# Patient Record
Sex: MALE | Race: BLACK OR AFRICAN AMERICAN | Employment: STUDENT | URBAN - METROPOLITAN AREA
[De-identification: names, ages, dates, MRNs, and addresses within clinical notes are randomized per-mention and may not be internally consistent; named-entity substitution may affect disease eponyms.]

---
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
## 2022-02-24 ENCOUNTER — OFFICE VISIT (OUTPATIENT)
Dept: INTERNAL MEDICINE CLINIC | Age: 22
End: 2022-02-24
Payer: COMMERCIAL

## 2022-02-24 VITALS
BODY MASS INDEX: 17.39 KG/M2 | HEART RATE: 113 BPM | OXYGEN SATURATION: 99 % | SYSTOLIC BLOOD PRESSURE: 132 MMHG | DIASTOLIC BLOOD PRESSURE: 80 MMHG | HEIGHT: 70 IN | TEMPERATURE: 98.5 F | WEIGHT: 121.5 LBS

## 2022-02-24 DIAGNOSIS — Z00.00 ROUTINE ADULT HEALTH MAINTENANCE: Primary | ICD-10-CM

## 2022-02-24 DIAGNOSIS — M54.2 CERVICALGIA: ICD-10-CM

## 2022-02-24 DIAGNOSIS — M54.50 ACUTE MIDLINE LOW BACK PAIN WITHOUT SCIATICA: ICD-10-CM

## 2022-02-24 DIAGNOSIS — R00.0 TACHYCARDIA: ICD-10-CM

## 2022-02-24 DIAGNOSIS — Z11.59 NEED FOR HEPATITIS C SCREENING TEST: ICD-10-CM

## 2022-02-24 PROCEDURE — 99204 OFFICE O/P NEW MOD 45 MIN: CPT | Performed by: INTERNAL MEDICINE

## 2022-02-24 PROCEDURE — 93000 ELECTROCARDIOGRAM COMPLETE: CPT | Performed by: INTERNAL MEDICINE

## 2022-02-24 NOTE — PROGRESS NOTES
Chief Complaint   Patient presents with    Establish Care     Visit Vitals  /80 (BP 1 Location: Left upper arm, BP Patient Position: Sitting, BP Cuff Size: Adult)   Pulse (!) 113   Temp 98.5 °F (36.9 °C) (Oral)   Ht 5' 10.28\" (1.785 m)   Wt 121 lb 8 oz (55.1 kg)   SpO2 99%   BMI 17.30 kg/m²         1. Have you been to the ER, urgent care clinic since your last visit? Hospitalized since your last visit? No    2. Have you seen or consulted any other health care providers outside of the [address] [place] [address] since your last visit? Include any pap smears or colon screening.  No

## 2022-02-24 NOTE — PATIENT INSTRUCTIONS
Please start monitoring your blood pressure at home using a home blood pressure monitor. Please record your readings and bring these with you to your next visit. Back Pain: Care Instructions  Your Care Instructions     Back pain has many possible causes. It is often related to problems with muscles and ligaments of the back. It may also be related to problems with the nerves, discs, or bones of the back. Moving, lifting, standing, sitting, or sleeping in an awkward way can strain the back. Sometimes you don't notice the injury until later. Arthritis is another common cause of back pain. Although it may hurt a lot, back pain usually improves on its own within several weeks. Most people recover in 12 weeks or less. Using good home treatment and being careful not to stress your back can help you feel better sooner. Follow-up care is a key part of your treatment and safety. Be sure to make and go to all appointments, and call your doctor if you are having problems. It's also a good idea to know your test results and keep a list of the medicines you take. How can you care for yourself at home? · Sit or lie in positions that are most comfortable and reduce your pain. Try one of these positions when you lie down:  ? Lie on your back with your knees bent and supported by large pillows. ? Lie on the floor with your legs on the seat of a sofa or chair. ? Lie on your side with your knees and hips bent and a pillow between your legs. ? Lie on your stomach if it does not make pain worse. · Do not sit up in bed, and avoid soft couches and twisted positions. Bed rest can help relieve pain at first, but it delays healing. Avoid bed rest after the first day of back pain. · Change positions every 30 minutes. If you must sit for long periods of time, take breaks from sitting. Get up and walk around, or lie in a comfortable position.   · Try using a heating pad on a low or medium setting for 15 to 20 minutes every 2 or 3 hours. Try a warm shower in place of one session with the heating pad. · You can also try an ice pack for 10 to 15 minutes every 2 to 3 hours. Put a thin cloth between the ice pack and your skin. · Take pain medicines exactly as directed. ? If the doctor gave you a prescription medicine for pain, take it as prescribed. ? If you are not taking a prescription pain medicine, ask your doctor if you can take an over-the-counter medicine. · Take short walks several times a day. You can start with 5 to 10 minutes, 3 or 4 times a day, and work up to longer walks. Walk on level surfaces and avoid hills and stairs until your back is better. · Return to work and other activities as soon as you can. Continued rest without activity is usually not good for your back. · To prevent future back pain, do exercises to stretch and strengthen your back and stomach. Learn how to use good posture, safe lifting techniques, and proper body mechanics. When should you call for help? Call your doctor now or seek immediate medical care if:    · You have new or worsening numbness in your legs.     · You have new or worsening weakness in your legs. (This could make it hard to stand up.)     · You lose control of your bladder or bowels. Watch closely for changes in your health, and be sure to contact your doctor if:    · You have a fever, lose weight, or don't feel well.     · You do not get better as expected. Where can you learn more? Go to http://www.high.com/  Enter I594 in the search box to learn more about \"Back Pain: Care Instructions. \"  Current as of: July 1, 2021               Content Version: 13.0  © 5000-5804 Healthwise, Incorporated. Care instructions adapted under license by [employer] (which disclaims liability or warranty for this information).  If you have questions about a medical condition or this instruction, always ask your healthcare professional. [employer] disclaims any warranty or liability for your use of this information. Nonsteroidal Anti-Inflammatory Drugs (NSAIDs): Care Instructions  Your Care Instructions     Nonsteroidal anti-inflammatory drugs (NSAIDs) relieve pain and fever. You also can use them to reduce swelling and inflammation. Over-the-counter NSAIDs include:  · Ibuprofen (Advil, Motrin). · Naproxen (Aleve). Aspirin (Carol, Bufferin) is also an NSAID. But it doesn't work the same way as these other NSAIDs. Prescription NSAIDs include:  · Diclofenac (Voltaren). · Indomethacin (Indocin). · Piroxicam (Feldene). Take NSAIDS exactly as prescribed. Call your doctor if you think you are having a problem with your medicine. If you are taking over-the-counter medicine, read and follow all instructions on the label. Follow-up care is a key part of your treatment and safety. Be sure to make and go to all appointments, and call your doctor if you are having problems. It's also a good idea to know your test results and keep a list of the medicines you take. What should you know about NSAIDs? · Do not use an over-the-counter NSAID for longer than 10 days. Talk to your doctor first.  · The most common side effects from NSAIDs are stomachaches, heartburn, and nausea. NSAIDs may irritate the stomach lining. If the medicine upsets your stomach, you can try taking it with food. But if that doesn't help, talk with your doctor to make sure it's not a more serious problem, such as a stomach ulcer or bleeding in the stomach or intestines. · Using NSAIDs may:  ? Lead to high blood pressure. ? Make symptoms of heart failure worse. ? Raise the risk of heart attack, stroke, kidney damage, and skin reactions. · Your risks are greater if you take NSAIDs at higher doses or for longer than the label says. People who are older than 72 or who have heart, stomach, or intestinal disease have a higher risk for problems. Aspirin  Aspirin is not like other NSAIDs. It can help people who are at high risk for heart attack or stroke. But taking aspirin isn't right for everyone, because it can cause serious bleeding. Talk to your doctor before you start taking aspirin every day. You and your doctor can decide if aspirin is a good choice for you based on your risk of a heart attack or stroke and your risk of serious bleeding. Unless you have a high risk of a heart attack or stroke, the benefits of aspirin probably won't outweigh the risk of bleeding. · If you use other NSAIDs a lot, aspirin may not work as well to prevent heart attack and stroke. · If you take aspirin every day for your heart, talk with your doctor before you take other NSAIDs. · Do not give aspirin to anyone younger than 20. It has been linked to Reye syndrome, a serious illness. When should you call for help? Call 911 anytime you think you may need emergency care. For example, call if:    · You passed out (lost consciousness).     · You vomit blood or what looks like coffee grounds.     · You pass maroon or very bloody stools. Call your doctor now or seek immediate medical care if:    · Your stools are black and tarlike or have streaks of blood. Watch closely for changes in your health, and be sure to contact your doctor if you have any problems. Where can you learn more? Go to http://www.gray.com/  Enter A328 in the search box to learn more about \"Nonsteroidal Anti-Inflammatory Drugs (NSAIDs): Care Instructions. \"  Current as of: April 8, 2021               Content Version: 13.0  © 2006-2021 Healthwise, Incorporated. Care instructions adapted under license by [employer] (which disclaims liability or warranty for this information). If you have questions about a medical condition or this instruction, always ask your healthcare professional. [place] [address] any warranty or liability for your use of this information.            Anxiety Disorder: Care Instructions  Your Care Instructions     Anxiety is a normal reaction to stress. Difficult situations can cause you to have symptoms such as sweaty palms and a nervous feeling. In an anxiety disorder, the symptoms are far more severe. Constant worry, muscle tension, trouble sleeping, nausea and diarrhea, and other symptoms can make normal daily activities difficult or impossible. These symptoms may occur for no reason, and they can affect your work, school, or social life. Medicines, counseling, and self-care can all help. Follow-up care is a key part of your treatment and safety. Be sure to make and go to all appointments, and call your doctor if you are having problems. It's also a good idea to know your test results and keep a list of the medicines you take. How can you care for yourself at home? · Take medicines exactly as directed. Call your doctor if you think you are having a problem with your medicine. · Go to your counseling sessions and follow-up appointments. · Recognize and accept your anxiety. Then, when you are in a situation that makes you anxious, say to yourself, \"This is not an emergency. I feel uncomfortable, but I am not in danger. I can keep going even if I feel anxious. \"  · Be kind to your body:  ? Relieve tension with exercise or a massage. ? Get enough rest.  ? Avoid alcohol, caffeine, nicotine, and illegal drugs. They can increase your anxiety level and cause sleep problems. ? Learn and do relaxation techniques. See below for more about these techniques. · Engage your mind. Get out and do something you enjoy. Go to a funny movie, or take a walk or hike. Plan your day. Having too much or too little to do can make you anxious. · Keep a record of your symptoms. Discuss your fears with a good friend or family member, or join a support group for people with similar problems. Talking to others sometimes relieves stress. · Get involved in social groups, or volunteer to help others. Being alone sometimes makes things seem worse than they are. · Get at least 30 minutes of exercise on most days of the week to relieve stress. Walking is a good choice. You also may want to do other activities, such as running, swimming, cycling, or playing tennis or team sports. Relaxation techniques  Do relaxation exercises 10 to 20 minutes a day. You can play soothing, relaxing music while you do them, if you wish. · Tell others in your house that you are going to do your relaxation exercises. Ask them not to disturb you. · Find a comfortable place, away from all distractions and noise. · Lie down on your back, or sit with your back straight. · Focus on your breathing. Make it slow and steady. · Breathe in through your nose. Breathe out through either your nose or mouth. · Breathe deeply, filling up the area between your navel and your rib cage. Breathe so that your belly goes up and down. · Do not hold your breath. · Breathe like this for 5 to 10 minutes. Notice the feeling of calmness throughout your whole body. As you continue to breathe slowly and deeply, relax by doing the following for another 5 to 10 minutes:  · Tighten and relax each muscle group in your body. You can begin at your toes and work your way up to your head. · Imagine your muscle groups relaxing and becoming heavy. · Empty your mind of all thoughts. · Let yourself relax more and more deeply. · Become aware of the state of calmness that surrounds you. · When your relaxation time is over, you can bring yourself back to alertness by moving your fingers and toes and then your hands and feet and then stretching and moving your entire body. Sometimes people fall asleep during relaxation, but they usually wake up shortly afterward. · Always give yourself time to return to full alertness before you drive a car or do anything that might cause an accident if you are not fully alert.  Never play a relaxation tape while you drive a car.  When should you call for help? Call 911 anytime you think you may need emergency care. For example, call if:    · You feel you cannot stop from hurting yourself or someone else. Keep the numbers for these national suicide hotlines: 2-906-098-TALK (8-543.533.7361) and 1-885-PBOYNNE (2-418.606.8328). If you or someone you know talks about suicide or feeling hopeless, get help right away. Watch closely for changes in your health, and be sure to contact your doctor if:    · You have anxiety or fear that affects your life.     · You have symptoms of anxiety that are new or different from those you had before. Where can you learn more? Go to http://www.high.com/  Enter P754 in the search box to learn more about \"Anxiety Disorder: Care Instructions. \"  Current as of: June 16, 2021               Content Version: 13.0  © 2006-2021 [employer]. Care instructions adapted under license by [employer] (which disclaims liability or warranty for this information). If you have questions about a medical condition or this instruction, always ask your healthcare professional. [address] any warranty or liability for your use of this information. Depression and Chronic Disease: Care Instructions  Your Care Instructions     A chronic disease is one that you have for a long time. Some chronic diseases can be controlled, but they usually cannot be cured. Depression is common in people with chronic diseases, but it often goes unnoticed. Many people have concerns about seeking treatment for a mental health problem. You may think it's a sign of weakness, or you don't want people to know about it. It's important to overcome these reasons for not seeking treatment. Treating depression or anxiety is good for your health. Follow-up care is a key part of your treatment and safety.  Be sure to make and go to all appointments, and call your doctor if you are having problems. It's also a good idea to know your test results and keep a list of the medicines you take. How can you care for yourself at home? Watch for symptoms of depression  The symptoms of depression are often subtle at first. You may think they are caused by your disease rather than depression. Or you may think it is normal to be depressed when you have a chronic disease. If you are depressed you may:  · Feel sad or hopeless. · Feel guilty or worthless. · Not enjoy the things you used to enjoy. · Feel hopeless, as though life is not worth living. · Have trouble thinking or remembering. · Have low energy, and you may not eat or sleep well. · Pull away from others. · Think often about death or killing yourself. (Keep the numbers for these national suicide hotlines: 6-486-479-TALK [1-235.279.2325] and 0-717-ZYDFSJU [1-950.793.4316]. )  Get treatment  By treating your depression, you can feel more hopeful and have more energy. If you feel better, you may take better care of yourself, so your health may improve. · Talk to your doctor if you have any changes in mood during treatment for your disease. · Ask your doctor for help. Counseling, antidepressant medicine, or a combination of the two can help most people with depression. Often a combination works best. Counseling can also help you cope with having a chronic disease. When should you call for help? Call 911 anytime you think you may need emergency care. For example, call if:    · You feel like hurting yourself or someone else.     · Someone you know has depression and is about to attempt or is attempting suicide. Call your doctor now or seek immediate medical care if:    · You hear voices.     · Someone you know has depression and:  ? Starts to give away his or her possessions. ? Uses illegal drugs or drinks alcohol heavily.   ? Talks or writes about death, including writing suicide notes or talking about guns, knives, or pills.  ? Starts to spend a lot of time alone. ? Acts very aggressively or suddenly appears calm. Watch closely for changes in your health, and be sure to contact your doctor if:    · You do not get better as expected. Where can you learn more? Go to http://www.gray.com/  Enter A548 in the search box to learn more about \"Depression and Chronic Disease: Care Instructions. \"  Current as of: June 16, 2021               Content Version: 13.0  © 2006-2021 [employer]. Care instructions adapted under license by [employer] (which disclaims liability or warranty for this information). If you have questions about a medical condition or this instruction, always ask your healthcare professional. [address] any warranty or liability for your use of this information.

## 2022-02-24 NOTE — PROGRESS NOTES
Subjective:     Chief Complaint   Patient presents with   UlJerman Schofield is a 22 y.o. M. This 22Year old male patient was seen most recently in the primary care setting in December 2020. He had been recently referred to the practice by his father. He was noted to be taking online classes in [employer] science, having recently changed to [employer] online because of COVID-19. He had declined flu vaccination. He was not sexually active at that time and was without significant medical history. Hypertension was noted but was attributed to nervousness/white coat syndrome. Routine labs were ordered. Today, this patient comes in for establishment and chief complaint of numbness and tingling in his hands as well as a concern about back pain. He does not have any prior medical history, and does not take any medications. He says that starting about 2 weeks ago, he had woken from bed with a feeling of numbness and tingling in his bilateral hands. This was accompanied by pain in his lower back. He also says that he felt that his body was generally weak at the time. He was able to walk, but felt unsteady. Over the past 2 weeks, the sensations have generally improved to the point where he no longer feels them; the tingling has generally gone away, and his lower back pain has generally improved. He denies having had any radiation of the pain down his legs, and furthermore denies any groin anesthesia during this time, any incontinence, fevers, chills, or prior antecedent trauma. He thinks that the back pain is now manageable at a level of 4 out of 10. He is now able to stand for prolonged periods of time, which he was not able to do previously. He denies any personal history of cancer, steroid use, or intravenous drug use. He denies any antecedent febrile syndromes including any upper respiratory symptoms or gastrointestinal symptoms.     He wonders if some of his symptoms might be stress related, because his grandfather, with whom he had been close, had recently passed away unexpectedly. He admits to some depression and anxiety but denies any suicidal or homicidal ideation. He overall feels that he is eating and drinking the same as usual, and denies any alcohol use, tobacco use, or recreational drug use. He denies any use of herbal medications. He is not currently sexually active. He reports being up-to-date with regard to his vaccinations, but does not have his vaccination record with him. He did get his Covid vaccine earlier this year. His review of systems is otherwise unremarkable. Past Medical History:  History reviewed. No pertinent past medical history. Past Surgical Histor:  History reviewed. No pertinent surgical history. Allergies:  No Known Allergies    Medications:  Current Outpatient Medications   Medication Sig Dispense Refill    multivitamin (ONE A DAY) tablet Take 1 Tab by mouth daily.          Social History:  Social History     Socioeconomic History    Marital status: SINGLE   Tobacco Use    Smoking status: Never Smoker    Smokeless tobacco: Never Used   Substance and Sexual Activity    Alcohol use: Never    Drug use: Never    Sexual activity: Never       Family History:  Family History   Problem Relation Age of Onset    No Known Problems Mother     No Known Problems Father     No Known Problems Sister     No Known Problems Brother     No Known Problems Maternal Aunt     No Known Problems Maternal Uncle     No Known Problems Paternal Aunt     No Known Problems Paternal Uncle     No Known Problems Maternal Grandmother     No Known Problems Maternal Grandfather     Diabetes Paternal Grandmother     No Known Problems Paternal Grandfather        Immunizations:  Immunization History   Administered Date(s) Administered    COVID-19, Pfizer Purple top, DILUTE for use, 12+ yrs, 30mcg/0.3mL dose 05/24/2021, 06/14/2021        Healthcare Maintenance:  Health Maintenance   Topic Date Due    Hepatitis C Screening  Never done    DTaP/Tdap/Td series (1 - Tdap) Never done    HPV Age 9Y-34Y (3 - Male 2-dose series) Never done    Flu Vaccine (1) Never done    COVID-19 Vaccine (3 - Booster for [employer] series) 11/14/2021    Depression Screen  12/29/2021    Hepatitis A Peds Age 1-18  Aged Out    Pneumococcal 0-64 years  Aged Out        Review of Systems:  ROS:  Review of Systems   Constitutional: Negative. HENT: Negative. Eyes: Negative. Respiratory: Negative. Cardiovascular: Positive for palpitations. Gastrointestinal: Negative. Genitourinary: Negative. Musculoskeletal: Positive for back pain and neck pain. Negative for falls, joint pain and myalgias. Skin: Negative. Neurological: Negative. Endo/Heme/Allergies: Negative. Psychiatric/Behavioral: Negative. ROS otherwise negative      Objective:     Vital Signs:  Visit Vitals  /80 (BP 1 Location: Left upper arm, BP Patient Position: Sitting, BP Cuff Size: Adult)   Pulse (!) 113   Temp 98.5 °F (36.9 °C) (Oral)   Ht 5' 10.28\" (1.785 m)   Wt 121 lb 8 oz (55.1 kg)   SpO2 99%   BMI 17.30 kg/m²       BMI:  Body mass index is 17.3 kg/m². Physical Examination:  Physical Exam  Vitals reviewed. Constitutional:       Appearance: Normal appearance. HENT:      Head: Normocephalic and atraumatic. Nose: Nose normal.      Mouth/Throat:      Mouth: Mucous membranes are moist.   Eyes:      Extraocular Movements: Extraocular movements intact. Conjunctiva/sclera: Conjunctivae normal.      Pupils: Pupils are equal, round, and reactive to light. Cardiovascular:      Rate and Rhythm: Tachycardia present. Rhythm irregular. Pulses: Normal pulses. Heart sounds: Normal heart sounds. No murmur heard. No friction rub. No gallop.        Comments: Regular with frequent irregularity consistent with respirophasic variation, 12lead EKG pending  Pulmonary:      Effort: Pulmonary effort is normal. No respiratory distress. Breath sounds: Normal breath sounds. No wheezing, rhonchi or rales. Abdominal:      General: Bowel sounds are normal. There is no distension. Palpations: Abdomen is soft. There is no mass. Tenderness: There is no abdominal tenderness. There is no guarding or rebound. Musculoskeletal:         General: No tenderness, deformity or signs of injury. Normal range of motion. Cervical back: Normal range of motion and neck supple. Right lower leg: No edema. Left lower leg: No edema. Skin:     General: Skin is warm and dry. Findings: No bruising, lesion or rash. Neurological:      General: No focal deficit present. Mental Status: He is alert and oriented to person, place, and time. Mental status is at baseline. Cranial Nerves: No cranial nerve deficit. Sensory: No sensory deficit. Motor: No weakness. Coordination: Coordination normal.      Gait: Gait normal.      Deep Tendon Reflexes: Reflexes normal.      Comments: Straight leg raise test negative bilaterally. Psychiatric:         Mood and Affect: Mood normal.         Behavior: Behavior normal.          Physical exam otherwise negative    Diagnostic Testing:    Laboratory Studies:  No visits with results within 1 Year(s) from this visit. Latest known visit with results is:   Office Visit on 12/29/2020   Component Date Value Ref Range Status    LIPID PROFILE 12/29/2020        Final    Cholesterol, total 12/29/2020 195  <200 MG/DL Final    Triglyceride 12/29/2020 134  <150 MG/DL Final    Comment: Based on NCEP-ATP III:  Triglycerides <150 mg/dL  is considered normal, 150-199  mg/dL  borderline high,  200-499 mg/dL high and  greater than or equal to 500  mg/dL very high.       HDL Cholesterol 12/29/2020 56  MG/DL Final    LDL, calculated 12/29/2020 112.2* 0 - 100 MG/DL Final    Comment: Based on the NCEP-ATP: LDL-C concentrations are considered  optimal <100 mg/dL,  near optimal/above Normal 100-129 mg/dL Borderline High: 130-159, High: 160-189  mg/dL Very High: Greater than or equal to 190 mg/dL      VLDL, calculated 12/29/2020 26.8  MG/DL Final    CHOL/HDL Ratio 12/29/2020 3.5  0.0 - 5.0   Final    Sodium 12/29/2020 137  136 - 145 mmol/L Final    Potassium 12/29/2020 4.2  3.5 - 5.1 mmol/L Final    Chloride 12/29/2020 105  97 - 108 mmol/L Final    CO2 12/29/2020 30  21 - 32 mmol/L Final    Anion gap 12/29/2020 2* 5 - 15 mmol/L Final    Glucose 12/29/2020 95  65 - 100 mg/dL Final    BUN 12/29/2020 17  6 - 20 MG/DL Final    Creatinine 12/29/2020 0.99  0.70 - 1.30 MG/DL Final    BUN/Creatinine ratio 12/29/2020 17  12 - 20   Final    GFR est AA 12/29/2020 >60  >60 ml/min/1.73m2 Final    GFR est non-AA 12/29/2020 >60  >60 ml/min/1.73m2 Final    Comment: Estimated GFR is calculated using the IDMS-traceable Modification of Diet in  Renal Disease (MDRD) Study equation, reported for both  Americans  (GFRAA) and non- Americans (GFRNA), and normalized to 1.73m2 body  surface area. The physician must decide which value applies to the patient.  Calcium 12/29/2020 9.3  8.5 - 10.1 MG/DL Final    Bilirubin, total 12/29/2020 0.8  0.2 - 1.0 MG/DL Final    ALT (SGPT) 12/29/2020 33  12 - 78 U/L Final    AST (SGOT) 12/29/2020 17  15 - 37 U/L Final    Alk. phosphatase 12/29/2020 87  45 - 117 U/L Final    Protein, total 12/29/2020 7.8  6.4 - 8.2 g/dL Final    Albumin 12/29/2020 4.5  3.5 - 5.0 g/dL Final    Globulin 12/29/2020 3.3  2.0 - 4.0 g/dL Final    A-G Ratio 12/29/2020 1.4  1.1 - 2.2   Final    WBC 12/29/2020 4.8  4.1 - 11.1 K/uL Final    RBC 12/29/2020 5.10  4. 10 - 5.70 M/uL Final    HGB 12/29/2020 13.6  12.1 - 17.0 g/dL Final    HCT 12/29/2020 42.7  36.6 - 50.3 % Final    MCV 12/29/2020 83.7  80.0 - 99.0 FL Final    MCH 12/29/2020 26.7  26.0 - 34.0 PG Final    MCHC 12/29/2020 31.9  30.0 - 36.5 g/dL Final    RDW 12/29/2020 12.4  11.5 - 14.5 % Final  PLATELET 09/56/3473 159  150 - 400 K/uL Final    MPV 12/29/2020 10.9  8.9 - 12.9 FL Final    NRBC 12/29/2020 0.0  0  WBC Final    ABSOLUTE NRBC 12/29/2020 0.00  0.00 - 0.01 K/uL Final         Radiographic Studies:  XR Results (most recent):  No results found for this or any previous visit. LYUBOV Results (most recent):  No results found for this or any previous visit. CT Results (most recent):  No results found for this or any previous visit. DEXA Results (most recent):  No results found for this or any previous visit. MRI Results (most recent):  No results found for this or any previous visit. Assessment/Plan:       ICD-10-CM ICD-9-CM    1. Routine adult health maintenance  Z00.00 V70.0 HEPATITIS C AB      HEMOGLOBIN A1C WITH EAG      CBC WITH AUTOMATED DIFF      METABOLIC PANEL, COMPREHENSIVE      LIPID PANEL      HIV 1/2 AG/AB, 4TH GENERATION,W RFLX CONFIRM   2. Need for hepatitis C screening test  Z11.59 V73.89 HEPATITIS C AB   3. Tachycardia  R00.0 785.0 AMB POC EKG ROUTINE W/ 12 LEADS, INTER & REP   4. Acute midline low back pain without sciatica  M54.50 724.2 SED RATE (ESR)   5. Cervicalgia  M54.2 723.1           This young male without significant past medical history presents with back pain and neck pain. On further questioning, the patient relates that he thinks he may have been sleeping on his bed funny, because it has a headboard and is set low to the ground. He thinks that he sometimes wakes up with his head in an awkward position. He does not have any red flag symptoms with regard to his back pain, and it is possible that the patient had a transient cervical radiculopathy or myelopathy from local inflammation, possibly from malpositioning of his head while sleeping. He does not have any focal neurological findings today on a more thorough evaluation. I am doubtful of a more systemic process such as demyelinating polyneuropathy or Guillain-Barré syndrome.   Given his nonfocal examination today, acute cervicalgia and lumbago from a simple musculoskeletal source seem the most likely. I do not see an indication for imaging currently given the patient's interval provement, but counseled the patient that should his symptoms fail to completely resolve with conservative management or should they return, that we would have a low threshold for obtaining plain films and an MRI at that point. He endorses agreement and understanding. With regard to the patient's relative hypertension and tachycardia, white coat syndrome seems likely. The patient will be counseled to monitor his blood pressure readings at home, and to consider bringing in his blood pressure cuff for calibration here in clinic. A 12-lead EKG is pending. I suspect that this will show normal sinus rhythm with respiratory phasic variation. Regarding the patient's anxiety, I suspect that this is related to temporary stressors; there are no other red flag symptoms at this time. Could consider pharmacotherapy but deferring this for now. Counseled patient that referral to psychology might be reasonable for brief psychotherapy or cognitive behavioral therapy. Holding off on this for now per patient, reconsider in the future. Return precautions provided. Patient endorses agreement/ understanding. Over 50% of today's 45-minute visit was spent counseling the patient regarding potential differential diagnoses, treatment options, and plan of care. Katheen Halsted, MD    Please note that this dictation was completed with [employer], the computer voice recognition software. Quite often unanticipated grammatical, syntax, homophones, and other interpretive errors are inadvertently transcribed by the computer software. Please disregard these errors. Please excuse any errors that have escaped final proofreading.

## 2023-02-22 NOTE — PROGRESS NOTES
ICD-10-CM ICD-9-CM    1. Routine adult health maintenance  Z00.00 V70.0 HEMOGLOBIN A1C WITH EAG      CBC WITH AUTOMATED DIFF      METABOLIC PANEL, COMPREHENSIVE      LIPID PANEL      MICROALBUMIN, UR, RAND W/ MICROALB/CREAT RATIO      2. Atypical chest pain  R07.89 786.59 AMB POC EKG ROUTINE W/ 12 LEADS, INTER & REP      3. Numbness and tingling in both hands  R20.0 782.0 REFERRAL TO NEUROLOGY    R20.2        4. Screening for viral disease  Z11.59 V73.99 HEPATITIS C AB      5. Cardiac murmur  R01.1 785.2 ECHO ADULT COMPLETE      REFERRAL TO CARDIOLOGY      6. Palpitations  R00.2 785. 1 ECG EVENT RECORD MONITOR SETUP AND ANALYSIS               Subjective:     Chief Complaint   Patient presents with    Physical       Mary Luo is a 25 y.o. M.  he  has a past medical history of Hypercholesterolemia. his last appointment in this clinic was 2/24/2022 . I reviewed and updated the medical record. At this patient's most recent appointment with me in February 2022, he had been feeling generally fine except for complaint of numbness and tingling in his hands and a concern regarding back pain. He had reported numbness and tingling in the bilateral hands. He was noted to have undergone some social stresses as some members of his family had recently passed away. Physical examination had demonstrated a low BMI of 17.3 kg/m², as well as tachycardia. Straight leg raise testing was negative. He was felt likely to have a simple musculoskeletal source for his symptoms. Routine laboratory studies were ordered. Today, the patient comes in for a yearly follow-up. He has today a complaint of chest pain as well as numbness and tingling of the hands as before. Chest pain: He complains today of a somewhat vague history of chest pain. The patient is noted to be a vague historian, and it is difficult to ascertain many details of his complaints.   He expresses a concern regarding recurrent chest discomfort, which typically occurs in the mornings, when he is waking up. He says that he thinks that this is because of the way he is laying in bed, although he is not able to explain why. He describes a vague chest discomfort, that he locates to the sternal area, typically in the mornings but at times at other times during the day. He thinks that he has episodes about 2-3 times per week, and thinks that this had really started about 6 months ago, gone away, and then come back, although once again he is somewhat vague regarding this. He denies any associated shortness of breath, dyspnea on exertion, or changes in exercise tolerance with this. He furthermore denies having had any other associated symptoms including fevers, chills, periods of immobilization, association with travel, history of blood clots, or any other constitutional or systemic complaints. He denies any orthopnea or paroxysmal nocturnal dyspnea. He denies having had any shortness of breath with the episodes, although he does have palpitations from time to time. He denies any family history of early cardiac disease or other significant cardiovascular or cerebrovascular disease, and he denies having had any other cardiac risk factors including smoking. He denies any heartburn. He denies any wheezing. Numbness/tingling: As before, he continues to complain of numbness and tingling in the bilateral hands. Although he had neck pain previously, this is not a current concern. He describes once again a vague history of intermittent numbness and cooling, typically occurring about 2-3 times per week, although not associated with the chest discomfort. He locates this to the third and fourth fingers of both hands, sometimes feeling this also in his feet from time to time. He denies any weakness. He denies any difficulty with balance. He denies any recent other focal neurological concerns.   His appetite and activity level have been normal.    His review of systems is otherwise negative. Routine Healthcare Maintenance issues are reviewed and discussed with the patient as noted below. Orders to update gaps in healthcare maintenance were placed as noted below in the Assessment and Plan, where applicable. Past Medical History:  Past Medical History:   Diagnosis Date    Hypercholesterolemia        Past Surgical Histor:  History reviewed. No pertinent surgical history. Allergies:  No Known Allergies    Medications:  Current Outpatient Medications   Medication Sig Dispense Refill    multivitamin (ONE A DAY) tablet Take 1 Tab by mouth daily.          Social History:  Social History     Socioeconomic History    Marital status: SINGLE   Tobacco Use    Smoking status: Never    Smokeless tobacco: Never   Substance and Sexual Activity    Alcohol use: Never    Drug use: Never    Sexual activity: Never       Family History:  Family History   Problem Relation Age of Onset    No Known Problems Mother     No Known Problems Father     No Known Problems Sister     No Known Problems Brother     No Known Problems Maternal Aunt     No Known Problems Maternal Uncle     No Known Problems Paternal Aunt     No Known Problems Paternal Uncle     No Known Problems Maternal Grandmother     No Known Problems Maternal Grandfather     Diabetes Paternal Grandmother     No Known Problems Paternal Grandfather        Immunizations:  Immunization History   Administered Date(s) Administered    COVID-19, PFIZER PURPLE top, DILUTE for use, (age 15 y+), IM, 30mcg/0.3mL 05/24/2021, 06/14/2021        Healthcare Maintenance:  Health Maintenance   Topic Date Due    Hepatitis C Screening  Never done    HPV Age 9Y-34Y (3 - Male 2-dose series) Never done    COVID-19 Vaccine (3 - Booster for Pfizer series) 08/09/2021    DTaP/Tdap/Td series (1 - Tdap) Never done    Flu Vaccine (1) Never done    Depression Screen  02/24/2023    Pneumococcal 0-64 years  Aged Out        Review of Systems:  ROS:  Review of Systems   Constitutional: Negative. Negative for chills, diaphoresis, fever, malaise/fatigue and weight loss. HENT: Negative. Eyes: Negative. Respiratory: Negative. Negative for cough, hemoptysis, sputum production, shortness of breath and wheezing. Cardiovascular:  Positive for chest pain and palpitations. Negative for orthopnea, claudication, leg swelling and PND. Gastrointestinal: Negative. Genitourinary: Negative. Musculoskeletal: Negative. Skin: Negative. Neurological:  Positive for tingling and sensory change. Negative for dizziness, tremors, speech change, focal weakness, seizures, loss of consciousness, weakness and headaches. Endo/Heme/Allergies: Negative. Psychiatric/Behavioral: Negative. ROS otherwise negative      Objective:     Vital Signs:  Visit Vitals  /82 (BP 1 Location: Left upper arm, BP Patient Position: Sitting, BP Cuff Size: Adult)   Pulse (!) 130   Ht 5' 9\" (1.753 m)   Wt 126 lb 9.6 oz (57.4 kg)   SpO2 100%   BMI 18.70 kg/m²       BMI:  Body mass index is 18.7 kg/m². Physical Examination:  Physical Exam  Vitals reviewed. Constitutional:       Appearance: Normal appearance. HENT:      Head: Normocephalic and atraumatic. Nose: Nose normal.      Mouth/Throat:      Mouth: Mucous membranes are moist.   Eyes:      Extraocular Movements: Extraocular movements intact. Conjunctiva/sclera: Conjunctivae normal.      Pupils: Pupils are equal, round, and reactive to light. Cardiovascular:      Rate and Rhythm: Regular rhythm. Tachycardia present. Pulses: Normal pulses. Heart sounds: Murmur (8-9/0 systolic maximal at apex) heard. No friction rub. No gallop. Comments: With respirophasic variation  Pulmonary:      Effort: Pulmonary effort is normal. No respiratory distress. Breath sounds: Normal breath sounds. No wheezing, rhonchi or rales.    Abdominal:      General: Bowel sounds are normal. There is no distension. Palpations: Abdomen is soft. There is no mass. Tenderness: There is no abdominal tenderness. There is no guarding or rebound. Musculoskeletal:         General: No tenderness, deformity or signs of injury. Normal range of motion. Cervical back: Normal range of motion and neck supple. Right lower leg: No edema. Left lower leg: No edema. Skin:     General: Skin is warm and dry. Findings: No bruising, lesion or rash. Neurological:      General: No focal deficit present. Mental Status: He is alert and oriented to person, place, and time. Mental status is at baseline. Cranial Nerves: No cranial nerve deficit. Sensory: No sensory deficit. Motor: No weakness. Coordination: Coordination normal.      Gait: Gait normal.      Deep Tendon Reflexes: Reflexes normal.   Psychiatric:         Mood and Affect: Mood normal.         Behavior: Behavior normal.        Physical exam otherwise negative    Diagnostic Testing:    Laboratory Studies:  No visits with results within 6 Month(s) from this visit. Latest known visit with results is:   Office Visit on 12/29/2020   Component Date Value Ref Range Status    LIPID PROFILE 12/29/2020        Final    Cholesterol, total 12/29/2020 195  <200 MG/DL Final    Triglyceride 12/29/2020 134  <150 MG/DL Final    Comment: Based on NCEP-ATP III:  Triglycerides <150 mg/dL  is considered normal, 150-199  mg/dL  borderline high,  200-499 mg/dL high and  greater than or equal to 500  mg/dL very high.       HDL Cholesterol 12/29/2020 56  MG/DL Final    LDL, calculated 12/29/2020 112.2 (A)  0 - 100 MG/DL Final    Comment: Based on the NCEP-ATP: LDL-C concentrations are considered  optimal <100 mg/dL,  near optimal/above Normal 100-129 mg/dL Borderline High: 130-159, High: 160-189  mg/dL Very High: Greater than or equal to 190 mg/dL      VLDL, calculated 12/29/2020 26.8  MG/DL Final    CHOL/HDL Ratio 12/29/2020 3.5  0.0 - 5.0   Final    Sodium 12/29/2020 137  136 - 145 mmol/L Final    Potassium 12/29/2020 4.2  3.5 - 5.1 mmol/L Final    Chloride 12/29/2020 105  97 - 108 mmol/L Final    CO2 12/29/2020 30  21 - 32 mmol/L Final    Anion gap 12/29/2020 2 (A)  5 - 15 mmol/L Final    Glucose 12/29/2020 95  65 - 100 mg/dL Final    BUN 12/29/2020 17  6 - 20 MG/DL Final    Creatinine 12/29/2020 0.99  0.70 - 1.30 MG/DL Final    BUN/Creatinine ratio 12/29/2020 17  12 - 20   Final    GFR est AA 12/29/2020 >60  >60 ml/min/1.73m2 Final    GFR est non-AA 12/29/2020 >60  >60 ml/min/1.73m2 Final    Comment: Estimated GFR is calculated using the IDMS-traceable Modification of Diet in  Renal Disease (MDRD) Study equation, reported for both  Americans  (GFRAA) and non- Americans (GFRNA), and normalized to 1.73m2 body  surface area. The physician must decide which value applies to the patient.  Calcium 12/29/2020 9.3  8.5 - 10.1 MG/DL Final    Bilirubin, total 12/29/2020 0.8  0.2 - 1.0 MG/DL Final    ALT (SGPT) 12/29/2020 33  12 - 78 U/L Final    AST (SGOT) 12/29/2020 17  15 - 37 U/L Final    Alk. phosphatase 12/29/2020 87  45 - 117 U/L Final    Protein, total 12/29/2020 7.8  6.4 - 8.2 g/dL Final    Albumin 12/29/2020 4.5  3.5 - 5.0 g/dL Final    Globulin 12/29/2020 3.3  2.0 - 4.0 g/dL Final    A-G Ratio 12/29/2020 1.4  1.1 - 2.2   Final    WBC 12/29/2020 4.8  4.1 - 11.1 K/uL Final    RBC 12/29/2020 5.10  4. 10 - 5.70 M/uL Final    HGB 12/29/2020 13.6  12.1 - 17.0 g/dL Final    HCT 12/29/2020 42.7  36.6 - 50.3 % Final    MCV 12/29/2020 83.7  80.0 - 99.0 FL Final    MCH 12/29/2020 26.7  26.0 - 34.0 PG Final    MCHC 12/29/2020 31.9  30.0 - 36.5 g/dL Final    RDW 12/29/2020 12.4  11.5 - 14.5 % Final    PLATELET 72/84/2541 573  150 - 400 K/uL Final    MPV 12/29/2020 10.9  8.9 - 12.9 FL Final    NRBC 12/29/2020 0.0  0  WBC Final    ABSOLUTE NRBC 12/29/2020 0.00  0.00 - 0.01 K/uL Final         Radiographic Studies:  XR Results (most recent):  No results found for this or any previous visit. LYUBOV Results (most recent):  No results found for this or any previous visit. CT Results (most recent):  No results found for this or any previous visit. DEXA Results (most recent):  No results found for this or any previous visit. MRI Results (most recent):  No results found for this or any previous visit. Assessment/Plan:       ICD-10-CM ICD-9-CM    1. Routine adult health maintenance  Z00.00 V70.0 HEMOGLOBIN A1C WITH EAG      CBC WITH AUTOMATED DIFF      METABOLIC PANEL, COMPREHENSIVE      LIPID PANEL      MICROALBUMIN, UR, RAND W/ MICROALB/CREAT RATIO      2. Atypical chest pain  R07.89 786.59 AMB POC EKG ROUTINE W/ 12 LEADS, INTER & REP      3. Numbness and tingling in both hands  R20.0 782.0 REFERRAL TO NEUROLOGY    R20.2        4. Screening for viral disease  Z11.59 V73.99 HEPATITIS C AB      5. Cardiac murmur  R01.1 785.2 ECHO ADULT COMPLETE      REFERRAL TO CARDIOLOGY      6. Palpitations  R00.2 785. 1 ECG EVENT RECORD MONITOR SETUP AND ANALYSIS             Atypical chest pain:  - Etiology unclear, patient is a vague historian  - I am strongly doubtful of ischemic heart disease given his complete lack of risk factors  - Underlying arrhythmia, structural heart disease including mitral valve prolapse a consideration given the cardiac murmur on examination, atypical symptoms  - Deferring stress testing, checking EKG today  - Echocardiogram ordered to evaluate murmur  - Event monitor to evaluate for potential association with any underlying arrhythmia, although I find this to be doubtful  - Additional laboratory studies as per above  - Referral placed to cardiology    Numbness/tingling:  - Etiology unclear, favor anxiety as a source for all of the above constellation of symptoms  - Given his recurrent complaint about this issue, however, obtaining nerve conduction studies, doubt cervical radiculopathy, doubt thoracic outlet syndrome  - Additional evaluation depending on nerve conduction studies outcome    I discussed these concerns with the patient today. I am doubtful of any serious organic pathology, but additional evaluation probably reasonable. Return precautions discussed the patient. Follow-up on these issues with me in 3 months. Follow-up and Dispositions    Return in about 3 months (around 5/27/2023). I have reviewed the patient's medical history in detail and updated the computerized patient record. We had a prolonged discussion about these complex clinical issues and went over the various important aspects to consider. All questions were answered. Advised the patient to call back or return to office if symptoms do not improve, change in nature, or persist.     The patient was given an after visit summary or informed of [employer] which includes patient instructions, diagnoses, current medications, & vitals. he expressed understanding with the diagnosis and plan. Huy Kahn MD    Please note that this dictation was completed with [employer], the computer voice recognition software. Quite often unanticipated grammatical, syntax, homophones, and other interpretive errors are inadvertently transcribed by the computer software. Please disregard these errors. Please excuse any errors that have escaped final proofreading.

## 2023-02-27 ENCOUNTER — OFFICE VISIT (OUTPATIENT)
Dept: INTERNAL MEDICINE CLINIC | Age: 23
End: 2023-02-27
Payer: COMMERCIAL

## 2023-02-27 VITALS
HEART RATE: 130 BPM | HEIGHT: 69 IN | SYSTOLIC BLOOD PRESSURE: 130 MMHG | OXYGEN SATURATION: 100 % | BODY MASS INDEX: 18.75 KG/M2 | WEIGHT: 126.6 LBS | DIASTOLIC BLOOD PRESSURE: 82 MMHG

## 2023-02-27 DIAGNOSIS — Z00.00 ROUTINE ADULT HEALTH MAINTENANCE: Primary | ICD-10-CM

## 2023-02-27 DIAGNOSIS — Z11.59 SCREENING FOR VIRAL DISEASE: ICD-10-CM

## 2023-02-27 DIAGNOSIS — R20.0 NUMBNESS AND TINGLING IN BOTH HANDS: ICD-10-CM

## 2023-02-27 DIAGNOSIS — R01.1 CARDIAC MURMUR: ICD-10-CM

## 2023-02-27 DIAGNOSIS — R00.2 PALPITATIONS: ICD-10-CM

## 2023-02-27 DIAGNOSIS — R20.2 NUMBNESS AND TINGLING IN BOTH HANDS: ICD-10-CM

## 2023-02-27 DIAGNOSIS — R07.89 ATYPICAL CHEST PAIN: ICD-10-CM

## 2023-02-27 PROCEDURE — 99214 OFFICE O/P EST MOD 30 MIN: CPT | Performed by: INTERNAL MEDICINE

## 2023-02-27 PROCEDURE — 93000 ELECTROCARDIOGRAM COMPLETE: CPT | Performed by: INTERNAL MEDICINE

## 2023-02-27 NOTE — PROGRESS NOTES
Chief Complaint   Patient presents with    Physical     Visit Vitals  /82 (BP 1 Location: Left upper arm, BP Patient Position: Sitting, BP Cuff Size: Adult)   Pulse (!) 130   Ht 5' 9\" (1.753 m)   Wt 126 lb 9.6 oz (57.4 kg)   SpO2 100%   BMI 18.70 kg/m²         1. \"Have you been to the ER, urgent care clinic since your last visit? Hospitalized since your last visit? \" No    2. \"Have you seen or consulted any other health care providers outside of the [address] [place] [address] since your last visit? \" No     3. For patients aged 39-70: Has the patient had a colonoscopy / FIT/ Cologuard? No      If the patient is female:    4. For patients aged 41-77: Has the patient had a mammogram within the past 2 years? No      5. For patients aged 21-65: Has the patient had a pap smear?  No

## 2023-02-28 LAB
ALBUMIN SERPL-MCNC: 4.5 G/DL (ref 3.5–5)
ALBUMIN/GLOB SERPL: 1.4 (ref 1.1–2.2)
ALP SERPL-CCNC: 78 U/L (ref 45–117)
ALT SERPL-CCNC: 37 U/L (ref 12–78)
ANION GAP SERPL CALC-SCNC: 7 MMOL/L (ref 5–15)
AST SERPL-CCNC: 22 U/L (ref 15–37)
BASOPHILS # BLD: 0 K/UL (ref 0–0.1)
BASOPHILS NFR BLD: 1 % (ref 0–1)
BILIRUB SERPL-MCNC: 1.1 MG/DL (ref 0.2–1)
BUN SERPL-MCNC: 14 MG/DL (ref 6–20)
BUN/CREAT SERPL: 13 (ref 12–20)
CALCIUM SERPL-MCNC: 9.3 MG/DL (ref 8.5–10.1)
CHLORIDE SERPL-SCNC: 105 MMOL/L (ref 97–108)
CHOLEST SERPL-MCNC: 175 MG/DL
CO2 SERPL-SCNC: 28 MMOL/L (ref 21–32)
CREAT SERPL-MCNC: 1.05 MG/DL (ref 0.7–1.3)
DIFFERENTIAL METHOD BLD: NORMAL
EOSINOPHIL # BLD: 0.1 K/UL (ref 0–0.4)
EOSINOPHIL NFR BLD: 2 % (ref 0–7)
ERYTHROCYTE [DISTWIDTH] IN BLOOD BY AUTOMATED COUNT: 12.6 % (ref 11.5–14.5)
EST. AVERAGE GLUCOSE BLD GHB EST-MCNC: 108 MG/DL
GLOBULIN SER CALC-MCNC: 3.3 G/DL (ref 2–4)
GLUCOSE SERPL-MCNC: 118 MG/DL (ref 65–100)
HBA1C MFR BLD: 5.4 % (ref 4–5.6)
HCT VFR BLD AUTO: 44.8 % (ref 36.6–50.3)
HCV AB SERPL QL IA: NONREACTIVE
HDLC SERPL-MCNC: 48 MG/DL
HDLC SERPL: 3.6 (ref 0–5)
HGB BLD-MCNC: 14.4 G/DL (ref 12.1–17)
IMM GRANULOCYTES # BLD AUTO: 0 K/UL (ref 0–0.04)
IMM GRANULOCYTES NFR BLD AUTO: 0 % (ref 0–0.5)
LDLC SERPL CALC-MCNC: 91.4 MG/DL (ref 0–100)
LYMPHOCYTES # BLD: 2.3 K/UL (ref 0.8–3.5)
LYMPHOCYTES NFR BLD: 39 % (ref 12–49)
MCH RBC QN AUTO: 26.9 PG (ref 26–34)
MCHC RBC AUTO-ENTMCNC: 32.1 G/DL (ref 30–36.5)
MCV RBC AUTO: 83.6 FL (ref 80–99)
MONOCYTES # BLD: 0.3 K/UL (ref 0–1)
MONOCYTES NFR BLD: 5 % (ref 5–13)
NEUTS SEG # BLD: 3.1 K/UL (ref 1.8–8)
NEUTS SEG NFR BLD: 53 % (ref 32–75)
NRBC # BLD: 0 K/UL (ref 0–0.01)
NRBC BLD-RTO: 0 PER 100 WBC
PLATELET # BLD AUTO: 202 K/UL (ref 150–400)
PMV BLD AUTO: 10.9 FL (ref 8.9–12.9)
POTASSIUM SERPL-SCNC: 3.9 MMOL/L (ref 3.5–5.1)
PROT SERPL-MCNC: 7.8 G/DL (ref 6.4–8.2)
RBC # BLD AUTO: 5.36 M/UL (ref 4.1–5.7)
SODIUM SERPL-SCNC: 140 MMOL/L (ref 136–145)
TRIGL SERPL-MCNC: 178 MG/DL (ref ?–150)
VLDLC SERPL CALC-MCNC: 35.6 MG/DL
WBC # BLD AUTO: 5.9 K/UL (ref 4.1–11.1)

## 2023-03-01 NOTE — PROGRESS NOTES
Labs generally normal; mild hyperglycemia, mild hypertriglyceridemia.  Continue current plan of care, awaiting other diagnostic studies